# Patient Record
Sex: FEMALE | Race: WHITE | NOT HISPANIC OR LATINO | Employment: OTHER
[De-identification: names, ages, dates, MRNs, and addresses within clinical notes are randomized per-mention and may not be internally consistent; named-entity substitution may affect disease eponyms.]

---

## 2017-10-02 ENCOUNTER — PREPPED CHART (OUTPATIENT)
Age: 62
End: 2017-10-02

## 2017-10-02 ASSESSMENT — VISUAL ACUITY
OD_SC: 20/70
OD_CC: J1
OS_CC: J1
OS_CC: 20/20-1
OD_CC: 20/20
OS_SC: 20/80
OD_SC: J12

## 2017-10-02 ASSESSMENT — TONOMETRY
OD_IOP_MMHG: 13
OS_IOP_MMHG: 12

## 2017-10-03 ENCOUNTER — ESTABLISHED COMPREHENSIVE EXAM (OUTPATIENT)
Age: 62
End: 2017-10-03

## 2017-10-03 DIAGNOSIS — H25.9: ICD-10-CM

## 2017-10-03 DIAGNOSIS — D31.31: ICD-10-CM

## 2017-10-03 DIAGNOSIS — H52.03: ICD-10-CM

## 2017-10-03 DIAGNOSIS — H52.4: ICD-10-CM

## 2017-10-03 DIAGNOSIS — H43.813: ICD-10-CM

## 2017-10-03 PROCEDURE — 92014 COMPRE OPH EXAM EST PT 1/>: CPT

## 2017-10-03 PROCEDURE — 92015 DETERMINE REFRACTIVE STATE: CPT

## 2017-10-03 ASSESSMENT — VISUAL ACUITY
OS_SC: 20/80
OD_CC: 20/25
OS_SC: J12
OD_CC: J1
OS_CC: 20/25
OD_SC: 20/70
OD_SC: J12
OS_CC: J1

## 2017-10-03 ASSESSMENT — TONOMETRY
OD_IOP_MMHG: 14
OS_IOP_MMHG: 14

## 2017-10-04 ENCOUNTER — ESTABLISHED COMPREHENSIVE EXAM (OUTPATIENT)
Dept: URBAN - METROPOLITAN AREA CLINIC 39 | Facility: CLINIC | Age: 62
End: 2017-10-04

## 2017-10-04 DIAGNOSIS — H43.393: ICD-10-CM

## 2017-10-04 DIAGNOSIS — H43.813: ICD-10-CM

## 2017-10-04 DIAGNOSIS — H35.363: ICD-10-CM

## 2017-10-04 DIAGNOSIS — D31.31: ICD-10-CM

## 2017-10-04 PROCEDURE — 9222550 BILAT EXTENDED OPHTHALMOSCOPY, FIRST

## 2017-10-04 PROCEDURE — 92012 INTRM OPH EXAM EST PATIENT: CPT

## 2017-10-04 PROCEDURE — 92134 CPTRZ OPH DX IMG PST SGM RTA: CPT

## 2017-10-04 ASSESSMENT — TONOMETRY
OS_IOP_MMHG: 13
OD_IOP_MMHG: 14

## 2017-10-04 ASSESSMENT — VISUAL ACUITY
OS_CC: J1
OS_CC: 20/20
OD_CC: J1
OD_CC: 20/20

## 2018-01-03 ENCOUNTER — ESTABLISHED PATIENT (OUTPATIENT)
Dept: URBAN - METROPOLITAN AREA CLINIC 39 | Facility: CLINIC | Age: 63
End: 2018-01-03

## 2018-01-03 DIAGNOSIS — D31.31: ICD-10-CM

## 2018-01-03 DIAGNOSIS — H35.363: ICD-10-CM

## 2018-01-03 DIAGNOSIS — H43.393: ICD-10-CM

## 2018-01-03 DIAGNOSIS — H43.813: ICD-10-CM

## 2018-01-03 PROCEDURE — 92012 INTRM OPH EXAM EST PATIENT: CPT

## 2018-01-03 ASSESSMENT — VISUAL ACUITY
OS_CC: 20/20-1
OD_CC: 20/20-1

## 2018-01-03 ASSESSMENT — TONOMETRY
OS_IOP_MMHG: 12
OD_IOP_MMHG: 13

## 2018-07-03 ENCOUNTER — ESTABLISHED COMPREHENSIVE EXAM (OUTPATIENT)
Dept: URBAN - METROPOLITAN AREA CLINIC 39 | Facility: CLINIC | Age: 63
End: 2018-07-03

## 2018-07-03 DIAGNOSIS — H35.363: ICD-10-CM

## 2018-07-03 DIAGNOSIS — D31.31: ICD-10-CM

## 2018-07-03 DIAGNOSIS — H43.813: ICD-10-CM

## 2018-07-03 DIAGNOSIS — H43.393: ICD-10-CM

## 2018-07-03 PROCEDURE — 92014 COMPRE OPH EXAM EST PT 1/>: CPT

## 2018-07-03 ASSESSMENT — VISUAL ACUITY
OD_CC: 20/20-1
OS_CC: 20/20-2

## 2018-07-03 ASSESSMENT — TONOMETRY
OD_IOP_MMHG: 10
OS_IOP_MMHG: 11

## 2019-07-09 ENCOUNTER — ESTABLISHED COMPREHENSIVE EXAM (OUTPATIENT)
Dept: URBAN - METROPOLITAN AREA CLINIC 39 | Facility: CLINIC | Age: 64
End: 2019-07-09

## 2019-07-09 DIAGNOSIS — D31.31: ICD-10-CM

## 2019-07-09 DIAGNOSIS — D31.32: ICD-10-CM

## 2019-07-09 DIAGNOSIS — H43.813: ICD-10-CM

## 2019-07-09 DIAGNOSIS — H43.393: ICD-10-CM

## 2019-07-09 PROCEDURE — 92012 INTRM OPH EXAM EST PATIENT: CPT

## 2019-07-09 PROCEDURE — 92134 CPTRZ OPH DX IMG PST SGM RTA: CPT

## 2019-07-09 ASSESSMENT — VISUAL ACUITY
OS_SC: 20/20-1
OD_SC: 20/20
OD_CC: J1
OS_CC: J1

## 2019-07-09 ASSESSMENT — TONOMETRY
OD_IOP_MMHG: 11
OS_IOP_MMHG: 12

## 2021-01-19 NOTE — PROCEDURE NOTE: CLINICAL
PROCEDURE NOTE: iLUX MGD Treatment OU. Diagnosis: Meibomian Gland Dysfunction. The iLUX Device is indicated for the application of heat and pressure therapy in adult patients with chronic disease of the eyelids, including Meibomian Gland Dysfunction (MGD), also known as evaporative dry eye. Potential adverse reactions include eyelid/eye pain, irritation, and inflammation, as well as ocular surface irritation and inflammation. After the risks, benefits, and alternatives were explained, the patient decided to undergo the treatment and informed consent was obtained. A drop of Proparacaine Hydrochloride 0.5% was used to anesthetize the ocular surfaces and the treatment was performed successfully on the upper and lower eyelids of both eyes. An antibiotic and steroid drop was instilled in each treated eye at the conclusion of the case. The patient tolerated the procedure well and there were no complications. Andrés eBnder PROCEDURE NOTE: Punctal Plugs, Aung Martel ([id number], [id number]) OU. Diagnosis: Dry Eye Syndrome. Prior to treatment, the risks/benefits/alternatives were discussed. The patient wished to proceed with procedure. Temporary collagen plugs were inserted. Patient tolerated procedure well. There were no complications. Post procedure instructions given. Andrés Bender

## 2021-01-19 NOTE — PATIENT DISCUSSION
Recommend warm compresses, lid scrubs, artificial tears, Z-pack monthly for 3 months, Xiidra BID OU **patient has previously failed Restasis/artificial tears****, BUL punctal plugs.

## 2021-01-19 NOTE — PATIENT DISCUSSION
Discussed possibility of  iLux treatment. Patient aware this would be an OOP cost and is not covered by insurance. Patient elects to proceed with this procedure today. FML BID OU until runs out following procedure.

## 2021-01-19 NOTE — PATIENT DISCUSSION
The patient has undergone maximal medical therapy with artificial tears and is still with signs and symptoms of ocular surface disease / dry eye syndrome. After risks, benefits, and alternatives were discussed, the patient would like to proceed with punctual plugging of bilateral upper puncti.

## 2021-01-19 NOTE — PROCEDURE NOTE: CLINICAL
PROCEDURE NOTE: iLUX MGD Treatment OU. Diagnosis: Meibomian Gland Dysfunction. The iLUX Device is indicated for the application of heat and pressure therapy in adult patients with chronic disease of the eyelids, including Meibomian Gland Dysfunction (MGD), also known as evaporative dry eye. Potential adverse reactions include eyelid/eye pain, irritation, and inflammation, as well as ocular surface irritation and inflammation. After the risks, benefits, and alternatives were explained, the patient decided to undergo the treatment and informed consent was obtained. A drop of Proparacaine Hydrochloride 0.5% was used to anesthetize the ocular surfaces and the treatment was performed successfully on the upper and lower eyelids of both eyes. An antibiotic and steroid drop was instilled in each treated eye at the conclusion of the case. The patient tolerated the procedure well and there were no complications. Andrés Bendre PROCEDURE NOTE: Punctal Plugs, Aung Martel ([id number], [id number]) OU. Diagnosis: Dry Eye Syndrome. Prior to treatment, the risks/benefits/alternatives were discussed. The patient wished to proceed with procedure. Temporary collagen plugs were inserted. Patient tolerated procedure well. There were no complications. Post procedure instructions given. Andrés Bender

## 2021-02-05 NOTE — PROCEDURE NOTE: SURGICAL
<p>Prior to commencing surgery patient identification, surgical procedure, site, and side were confirmed by Dr. Ryan Sullivan. Following topical proparacaine anesthesia, the patient was positioned at the YAG laser, a contact lens coupled to the cornea with methylcellulose and an axial posterior capsulotomy performed without complication using 3.5 Mj x 35. Excess methylcellulose was washed from the eye, one drop of Alphagan was instilled and the patient returned to the holding area having tolerated the procedure well and without complication. </p><p>MRN:[id number]</p>

## 2021-03-19 NOTE — PROCEDURE NOTE: SURGICAL
<p>Prior to commencing surgery patient identification, surgical procedure, site, and side were confirmed by Dr. Mike Acevedo. Following topical proparacaine anesthesia, the patient was positioned at the YAG laser, a contact lens coupled to the cornea with methylcellulose and an axial posterior capsulotomy performed without complication using 3.7 Mj x 44. Excess methylcellulose was washed from the eye, one drop of Alphagan was instilled and the patient returned to the holding area having tolerated the procedure well and without complication. </p><p>MRN: [id number]</p>

## 2024-08-15 ENCOUNTER — NEW PATIENT (OUTPATIENT)
Dept: URBAN - METROPOLITAN AREA CLINIC 38 | Facility: CLINIC | Age: 69
End: 2024-08-15

## 2024-08-15 DIAGNOSIS — D31.32: ICD-10-CM

## 2024-08-15 DIAGNOSIS — D31.31: ICD-10-CM

## 2024-08-15 DIAGNOSIS — H43.813: ICD-10-CM

## 2024-08-15 DIAGNOSIS — H25.813: ICD-10-CM

## 2024-08-15 DIAGNOSIS — H35.363: ICD-10-CM

## 2024-08-15 DIAGNOSIS — H43.393: ICD-10-CM

## 2024-08-15 DIAGNOSIS — H40.033: ICD-10-CM

## 2024-08-15 PROCEDURE — 92004 COMPRE OPH EXAM NEW PT 1/>: CPT

## 2024-08-15 PROCEDURE — 92015 DETERMINE REFRACTIVE STATE: CPT

## 2024-08-15 PROCEDURE — 92134 CPTRZ OPH DX IMG PST SGM RTA: CPT

## 2024-08-15 ASSESSMENT — VISUAL ACUITY
OU_SC: 20/30
OS_SC: J8
OD_CC: J1
OS_CC: 20/50
OU_CC: 20/40-1
OD_CC: 20/60
OD_SC: J8
OS_SC: 20/50+1
OS_CC: J2
OU_CC: J1
OU_SC: J7
OD_SC: 20/30

## 2024-08-15 ASSESSMENT — KERATOMETRY
OD_AXISANGLE2_DEGREES: 75
OD_K2POWER_DIOPTERS: 44.25
OS_K2POWER_DIOPTERS: 44.25
OD_AXISANGLE_DEGREES: 165
OS_AXISANGLE_DEGREES: 5
OS_AXISANGLE2_DEGREES: 95
OS_K1POWER_DIOPTERS: 43.75
OD_K1POWER_DIOPTERS: 44.00

## 2024-08-15 ASSESSMENT — TONOMETRY
OS_IOP_MMHG: 13
OD_IOP_MMHG: 14

## 2025-08-18 ENCOUNTER — NEW PATIENT (OUTPATIENT)
Age: 70
End: 2025-08-18

## 2025-08-18 DIAGNOSIS — H40.033: ICD-10-CM

## 2025-08-18 DIAGNOSIS — H35.363: ICD-10-CM

## 2025-08-18 DIAGNOSIS — H43.813: ICD-10-CM

## 2025-08-18 DIAGNOSIS — H43.393: ICD-10-CM

## 2025-08-18 DIAGNOSIS — D31.32: ICD-10-CM

## 2025-08-18 DIAGNOSIS — D31.31: ICD-10-CM

## 2025-08-18 DIAGNOSIS — H25.813: ICD-10-CM

## 2025-08-18 PROCEDURE — 92004 COMPRE OPH EXAM NEW PT 1/>: CPT

## 2025-08-18 PROCEDURE — 92015 DETERMINE REFRACTIVE STATE: CPT
